# Patient Record
Sex: FEMALE | Race: WHITE
[De-identification: names, ages, dates, MRNs, and addresses within clinical notes are randomized per-mention and may not be internally consistent; named-entity substitution may affect disease eponyms.]

---

## 2018-02-03 ENCOUNTER — HOSPITAL ENCOUNTER (EMERGENCY)
Dept: HOSPITAL 46 - ED | Age: 63
Discharge: HOME | End: 2018-02-03
Payer: COMMERCIAL

## 2018-02-03 VITALS — WEIGHT: 184.99 LBS | BODY MASS INDEX: 32.78 KG/M2 | HEIGHT: 63 IN

## 2018-02-03 DIAGNOSIS — E03.9: ICD-10-CM

## 2018-02-03 DIAGNOSIS — F32.9: ICD-10-CM

## 2018-02-03 DIAGNOSIS — I10: ICD-10-CM

## 2018-02-03 DIAGNOSIS — Z90.49: ICD-10-CM

## 2018-02-03 DIAGNOSIS — F41.9: ICD-10-CM

## 2018-02-03 DIAGNOSIS — Z79.899: ICD-10-CM

## 2018-02-03 DIAGNOSIS — Z88.8: ICD-10-CM

## 2018-02-03 DIAGNOSIS — Z90.89: ICD-10-CM

## 2018-02-03 DIAGNOSIS — R07.89: Primary | ICD-10-CM

## 2018-02-03 NOTE — XMS
Clinical Summary
  Created on: 2018
 
 Yolette Faust
 External Reference #: [id number]
 : [birth year]
 Sex: Female
 
 Demographics
 
 
+-----------------------+------------------------+
| Address               | [address]        |
|                       | [place]  [address]   |
+-----------------------+------------------------+
| Home Phone            | [phone]        |
+-----------------------+------------------------+
| Preferred Language    | Unknown                |
+-----------------------+------------------------+
| Marital Status        |                 |
+-----------------------+------------------------+
| Church Affiliation | Unknown                |
+-----------------------+------------------------+
| Race                  | White                  |
+-----------------------+------------------------+
| Ethnic Group          | Not  or  |
+-----------------------+------------------------+
 
 
 Author
 
 
+--------------+-----------------------+
| Author       | [facility] |
+--------------+-----------------------+
| Organization | [facility] |
+--------------+-----------------------+
| Address      | Unknown               |
+--------------+-----------------------+
| Phone        | Unavailable           |
+--------------+-----------------------+
 
 
 
 Support
 
 
+------+--------------+---------+-----------------+
| Name | Relationship | Address | Phone           |
+------+--------------+---------+-----------------+
 ECON         | Unknown | [phone] |
+------+--------------+---------+-----------------+
 
 
 
 Care Team Providers
 
 
 
+-----------------------+------+-------------+
| Care Team Member Name | Role | Phone       |
+-----------------------+------+-------------+
 PP   | Unavailable |
+-----------------------+------+-------------+
 
 
 
 Source Comments
 NATALIE is fully live on both [facility] and [facility]
 
 Allergies
 No Known Allergies
 
 Current Medications
 
 
+----------------------+----------------------+-------+---------+------+------+-------+
| Prescription         | Sig.                 | Disp. | Refills | Star | End  | Statu |
|                      |                      |       |         | t    | Date | s     |
|                      |                      |       |         | Date |      |       |
+----------------------+----------------------+-------+---------+------+------+-------+
|                      | Take 1 Tab by mouth  |       |         |      |      | Activ |
| candesartan-hydrochl | once daily.          |       |         |      |      | e     |
| orothiazide (ATACAND |                      |       |         |      |      |       |
|  HCT) 32-12.5 mg     |                      |       |         |      |      |       |
| Oral Tablet          |                      |       |         |      |      |       |
+----------------------+----------------------+-------+---------+------+------+-------+
|   atorvastatin       | Take 20 mg by mouth  |       |         |      |      | Activ |
| (LIPITOR) 20 mg Oral | once daily.          |       |         |      |      | e     |
|  Tablet              |                      |       |         |      |      |       |
+----------------------+----------------------+-------+---------+------+------+-------+
|   Levothyroxine 88   | Take 1 Cap by mouth  |       |         |      |      | Activ |
| mcg Oral Capsule     | once daily.          |       |         |      |      | e     |
+----------------------+----------------------+-------+---------+------+------+-------+
|   sertraline         | Take 200 mg by mouth |       |         |      |      | Activ |
| (ZOLOFT) 100 mg Oral |  once daily.         |       |         |      |      | e     |
|  Tablet              |                      |       |         |      |      |       |
+----------------------+----------------------+-------+---------+------+------+-------+
|   ARIPiprazole       | Take 2 mg by mouth   |       |         |      |      | Activ |
| (ABILIFY) 2 mg Oral  | once daily.          |       |         |      |      | e     |
| Tablet               |                      |       |         |      |      |       |
+----------------------+----------------------+-------+---------+------+------+-------+
 
 
 
 Active Problems
 
 
+-------------------------+------------+
| Problem                 | Noted Date |
+-------------------------+------------+
| Spinal meningioma (HCC) | 2013 |
+-------------------------+------------+
 
 
 
 Family History
 
 
 
+-----------------+----------+------+----------+
| Medical History | Relation | Name | Comments |
+-----------------+----------+------+----------+
| Arthritis       | Mother   |      |          |
+-----------------+----------+------+----------+
| Hypertension    | Mother   |      |          |
+-----------------+----------+------+----------+
 
 
 
+----------+------+--------+----------+
| Relation | Name | Status | Comments |
+----------+------+--------+----------+
| Mother   |      |        |          |
+----------+------+--------+----------+
 
 
 
 Social History
 
 
+--------------+-------+-----------+--------+------+
| Tobacco Use  | Types | Packs/Day | Years  | Date |
|              |       |           | Used   |      |
+--------------+-------+-----------+--------+------+
| Never Smoker |       |           |        |      |
+--------------+-------+-----------+--------+------+
 
 
 
+-------------+-----------+---------+----------+
| Alcohol Use | Drinks/We | oz/Week | Comments |
|             | ek        |         |          |
+-------------+-----------+---------+----------+
| No          |           |         |          |
+-------------+-----------+---------+----------+
 
 
 
+------------------+---------------+
| Sex Assigned at  | Date Recorded |
| Birth            |               |
+------------------+---------------+
| Not on file      |               |
+------------------+---------------+
 
 
 
 Last Filed Vital Signs
 
 
+-------------------+----------------------+-------------------------+
| Vital Sign        | Reading              | Time Taken              |
+-------------------+----------------------+-------------------------+
| Blood Pressure    | 100/58               | 2013 12:43 PM PDT |
+-------------------+----------------------+-------------------------+
| Pulse             | 112                  | 2013 12:43 PM PDT |
+-------------------+----------------------+-------------------------+
| Temperature       | 36.8   C (98.3   F)  | 2013 12:43 PM PDT |
 
+-------------------+----------------------+-------------------------+
| Respiratory Rate  | -                    | -                       |
+-------------------+----------------------+-------------------------+
| Oxygen Saturation | -                    | -                       |
+-------------------+----------------------+-------------------------+
| Inhaled Oxygen    | -                    | -                       |
| Concentration     |                      |                         |
+-------------------+----------------------+-------------------------+
| Weight            | 96.1 kg (211 lb 12.8 | 2013 12:43 PM PDT |
|                   |  oz)                 |                         |
+-------------------+----------------------+-------------------------+
| Height            | 161.3 cm (5' 3.5")   | 2011  1:01 PM PST |
+-------------------+----------------------+-------------------------+
| Body Mass Index   | 36.93                | 2013 12:43 PM PDT |
+-------------------+----------------------+-------------------------+
 
 
 
 Plan of Treatment
 
 
+--------------------+-----------+-----------+----------+
| Health Maintenance | Due Date  | Last Done | Comments |
+--------------------+-----------+-----------+----------+
| INFLUENZA VACCINE  |  |           |          |
| (FLU SHOT)         | 7         |           |          |
+--------------------+-----------+-----------+----------+
 
 
 
 Results
 Not on filefrom Last 3 Months

## 2018-02-03 NOTE — XMS
Clinical Summary
  Created on: 2018
 
 Yolette Faust
 External Reference #: [id number]
 : [birth year]
 Sex: Female
 
 Demographics
 
 
+-----------------------+------------------------+
| Address               | [address]        |
|                       | [place]  [address]   |
+-----------------------+------------------------+
| Home Phone            | [phone]        |
+-----------------------+------------------------+
| Preferred Language    | Unknown                |
+-----------------------+------------------------+
| Marital Status        |                 |
+-----------------------+------------------------+
| [employer] Affiliation | Unknown                |
+-----------------------+------------------------+
| Race                  | White                  |
+-----------------------+------------------------+
| Ethnic Group          | Not  or  |
+-----------------------+------------------------+
 
 
 Author
 
 
+--------------+-----------------------+
| Author       | [facility] |
+--------------+-----------------------+
| Organization | [facility] |
+--------------+-----------------------+
| Address      | Unknown               |
+--------------+-----------------------+
| Phone        | Unavailable           |
+--------------+-----------------------+
 
 
 
 Support
 
 
+------+--------------+---------+-----------------+
| Name | Relationship | Address | Phone           |
+------+--------------+---------+-----------------+
 ECON         | Unknown | [phone] |
+------+--------------+---------+-----------------+
 
 
 
 Care Team Providers
 
 
 
+-----------------------+------+-------------+
| Care Team Member Name | Role | Phone       |
+-----------------------+------+-------------+
 PP   | Unavailable |
+-----------------------+------+-------------+
 
 
 
 Source Comments
 NATALIE is fully live on both [facility] and [facility]
 
 Allergies
 No Known Allergies
 
 Current Medications
 
 
+----------------------+----------------------+-------+---------+------+------+-------+
| Prescription         | Sig.                 | Disp. | Refills | Star | End  | Statu |
|                      |                      |       |         | t    | Date | s     |
|                      |                      |       |         | Date |      |       |
+----------------------+----------------------+-------+---------+------+------+-------+
|                      | Take 1 Tab by mouth  |       |         |      |      | Activ |
| candesartan-hydrochl | once daily.          |       |         |      |      | e     |
| orothiazide (ATACAND |                      |       |         |      |      |       |
|  HCT) 32-12.5 mg     |                      |       |         |      |      |       |
| Oral Tablet          |                      |       |         |      |      |       |
+----------------------+----------------------+-------+---------+------+------+-------+
|   atorvastatin       | Take 20 mg by mouth  |       |         |      |      | Activ |
| (LIPITOR) 20 mg Oral | once daily.          |       |         |      |      | e     |
|  Tablet              |                      |       |         |      |      |       |
+----------------------+----------------------+-------+---------+------+------+-------+
|   Levothyroxine 88   | Take 1 Cap by mouth  |       |         |      |      | Activ |
| mcg Oral Capsule     | once daily.          |       |         |      |      | e     |
+----------------------+----------------------+-------+---------+------+------+-------+
|   sertraline         | Take 200 mg by mouth |       |         |      |      | Activ |
| (ZOLOFT) 100 mg Oral |  once daily.         |       |         |      |      | e     |
|  Tablet              |                      |       |         |      |      |       |
+----------------------+----------------------+-------+---------+------+------+-------+
|   ARIPiprazole       | Take 2 mg by mouth   |       |         |      |      | Activ |
| (ABILIFY) 2 mg Oral  | once daily.          |       |         |      |      | e     |
| Tablet               |                      |       |         |      |      |       |
+----------------------+----------------------+-------+---------+------+------+-------+
 
 
 
 Active Problems
 
 
+-------------------------+------------+
| Problem                 | Noted Date |
+-------------------------+------------+
| Spinal meningioma (HCC) | 2013 |
+-------------------------+------------+
 
 
 
 Family History
 
 
 
+-----------------+----------+------+----------+
| Medical History | Relation | Name | Comments |
+-----------------+----------+------+----------+
| Arthritis       | Mother   |      |          |
+-----------------+----------+------+----------+
| Hypertension    | Mother   |      |          |
+-----------------+----------+------+----------+
 
 
 
+----------+------+--------+----------+
| Relation | Name | Status | Comments |
+----------+------+--------+----------+
| Mother   |      |        |          |
+----------+------+--------+----------+
 
 
 
 Social History
 
 
+--------------+-------+-----------+--------+------+
| Tobacco Use  | Types | Packs/Day | Years  | Date |
|              |       |           | Used   |      |
+--------------+-------+-----------+--------+------+
| Never Smoker |       |           |        |      |
+--------------+-------+-----------+--------+------+
 
 
 
+-------------+-----------+---------+----------+
| Alcohol Use | Drinks/We | oz/Week | Comments |
|             | ek        |         |          |
+-------------+-----------+---------+----------+
| No          |           |         |          |
+-------------+-----------+---------+----------+
 
 
 
+------------------+---------------+
| Sex Assigned at  | Date Recorded |
| Birth            |               |
+------------------+---------------+
| Not on file      |               |
+------------------+---------------+
 
 
 
 Last Filed Vital Signs
 
 
+-------------------+----------------------+-------------------------+
| Vital Sign        | Reading              | Time Taken              |
+-------------------+----------------------+-------------------------+
| Blood Pressure    | 100/58               | 2013 12:43 PM PDT |
+-------------------+----------------------+-------------------------+
| Pulse             | 112                  | 2013 12:43 PM PDT |
+-------------------+----------------------+-------------------------+
| Temperature       | 36.8   C (98.3   F)  | 2013 12:43 PM PDT |
 
+-------------------+----------------------+-------------------------+
| Respiratory Rate  | -                    | -                       |
+-------------------+----------------------+-------------------------+
| Oxygen Saturation | -                    | -                       |
+-------------------+----------------------+-------------------------+
| Inhaled Oxygen    | -                    | -                       |
| Concentration     |                      |                         |
+-------------------+----------------------+-------------------------+
| Weight            | 96.1 kg (211 lb 12.8 | 2013 12:43 PM PDT |
|                   |  oz)                 |                         |
+-------------------+----------------------+-------------------------+
| Height            | 161.3 cm (5' 3.5")   | 2011  1:01 PM PST |
+-------------------+----------------------+-------------------------+
| Body Mass Index   | 36.93                | 2013 12:43 PM PDT |
+-------------------+----------------------+-------------------------+
 
 
 
 Plan of Treatment
 
 
+--------------------+-----------+-----------+----------+
| Health Maintenance | Due Date  | Last Done | Comments |
+--------------------+-----------+-----------+----------+
| INFLUENZA VACCINE  |  |           |          |
| (FLU SHOT)         | 7         |           |          |
+--------------------+-----------+-----------+----------+
 
 
 
 Results
 Not on filefrom Last 3 Months

## 2018-02-05 NOTE — EKG
[facility]
                                    [address]
                                  [place], [place]  [address]
_________________________________________________________________________________________
                                                                 Signed   
 
 
Normal sinus rhythm
Normal ECG
When compared with ECG of 30-MAY-2017 08:43,
No significant change was found
Confirmed by EDUARDO GRIFFIN MD ([id number]) on 2/5/2018 2:47:29 PM
 
 
 
 
 
 
 
 
 
 
 
 
 
 
 
 
 
 
 
 
 
 
 
 
 
 
 
 
 
 
 
 
 
 
 
 
 
 
 
 
    Electronically Signed By: EDUARDO GRIFFIN MD  02/05/18 1447
_________________________________________________________________________________________
PATIENT NAME:     SANIYA SOTO                     
MEDICAL RECORD #: [id number]                     Electrocardiogram             
          ACCT #: [id number]  
DATE OF BIRTH:   [birth year]                                       
PHYSICIAN:   EDUARDO GRIFFIN MD           REPORT #: [id number]
REPORT IS CONFIDENTIAL AND NOT TO BE RELEASED WITHOUT AUTHORIZATION

## 2018-10-01 ENCOUNTER — HOSPITAL ENCOUNTER (EMERGENCY)
Dept: HOSPITAL 46 - ED | Age: 63
Discharge: HOME | End: 2018-10-01
Payer: MEDICARE

## 2018-10-01 VITALS — HEIGHT: 63 IN | WEIGHT: 180.01 LBS | BODY MASS INDEX: 31.89 KG/M2

## 2018-10-01 DIAGNOSIS — M79.601: Primary | ICD-10-CM

## 2018-10-01 DIAGNOSIS — I10: ICD-10-CM

## 2018-10-01 DIAGNOSIS — Z79.82: ICD-10-CM

## 2018-10-01 DIAGNOSIS — Z88.8: ICD-10-CM

## 2018-10-01 DIAGNOSIS — Z79.899: ICD-10-CM

## 2018-10-01 DIAGNOSIS — E11.9: ICD-10-CM

## 2018-10-01 DIAGNOSIS — F32.9: ICD-10-CM

## 2018-10-01 DIAGNOSIS — F41.9: ICD-10-CM

## 2018-10-01 DIAGNOSIS — E03.9: ICD-10-CM

## 2018-10-06 ENCOUNTER — HOSPITAL ENCOUNTER (EMERGENCY)
Dept: HOSPITAL 46 - ED | Age: 63
End: 2018-10-06
Payer: MEDICARE

## 2018-10-06 VITALS — HEIGHT: 63 IN | BODY MASS INDEX: 31.89 KG/M2 | WEIGHT: 180.01 LBS

## 2018-10-06 DIAGNOSIS — Z88.8: ICD-10-CM

## 2018-10-06 DIAGNOSIS — Z79.899: ICD-10-CM

## 2018-10-06 DIAGNOSIS — I10: ICD-10-CM

## 2018-10-06 DIAGNOSIS — M25.511: ICD-10-CM

## 2018-10-06 DIAGNOSIS — Z79.82: ICD-10-CM

## 2018-10-06 DIAGNOSIS — E11.9: ICD-10-CM

## 2018-10-06 DIAGNOSIS — E03.9: ICD-10-CM

## 2018-10-06 DIAGNOSIS — G89.18: Primary | ICD-10-CM

## 2018-10-06 DIAGNOSIS — F41.9: ICD-10-CM

## 2018-10-06 DIAGNOSIS — F32.9: ICD-10-CM

## 2018-10-06 NOTE — XMS
Clinical Summary
  Created on: 10/08/2018
 
 Yolette Soto
 External Reference #: [id number]
 : [birth year]
 Sex: Female
 
 Demographics
 
 
+-----------------------+------------------------+
| Address               | [address]        |
|                       | [place]  [address]   |
+-----------------------+------------------------+
| Home Phone            | [phone]        |
+-----------------------+------------------------+
| Preferred Language    | Unknown                |
+-----------------------+------------------------+
| Marital Status        |                 |
+-----------------------+------------------------+
| Taoist Affiliation | Unknown                |
+-----------------------+------------------------+
| Race                  | White                  |
+-----------------------+------------------------+
| Ethnic Group          | Not  or  |
+-----------------------+------------------------+
 
 
 Author
 
 
+--------------+-----------------------+
| Author       | [facility] |
+--------------+-----------------------+
| Organization | [facility] |
+--------------+-----------------------+
| Address      | Unknown               |
+--------------+-----------------------+
| Phone        | Unavailable           |
+--------------+-----------------------+
 
 
 
 Support
 
 
+-----------+--------------+---------+-----------------+
| Name      | Relationship | Address | Phone           |
+-----------+--------------+---------+-----------------+
| Nadia Vu | ECON         | Unknown | [phone] |
+-----------+--------------+---------+-----------------+
 
 
 
 Care Team Providers
 
 
 
+-----------------------+------+-------------+
| Care Team Member Name | Role | Phone       |
+-----------------------+------+-------------+
 PP   | Unavailable |
+-----------------------+------+-------------+
 
 
 
 Source Comments
 [facility] is fully live on both [facility] and [facility]
 
 Allergies
 No Known Allergies
 
 Current Medications
 
 
+----------------------+----------------------+-------+---------+------+------+-------+
| Prescription         | Sig.                 | Disp. | Refills | Star | End  | Statu |
|                      |                      |       |         | t    | Date | s     |
|                      |                      |       |         | Date |      |       |
+----------------------+----------------------+-------+---------+------+------+-------+
|                      | Take 1 Tab by mouth  |       |         |      |      | Activ |
| candesartan-hydrochl | once daily.          |       |         |      |      | e     |
| orothiazide (ATACAND |                      |       |         |      |      |       |
|  HCT) 32-12.5 mg     |                      |       |         |      |      |       |
| Oral Tablet          |                      |       |         |      |      |       |
+----------------------+----------------------+-------+---------+------+------+-------+
|   atorvastatin       | Take 20 mg by mouth  |       |         |      |      | Activ |
| (LIPITOR) 20 mg Oral | once daily.          |       |         |      |      | e     |
|  Tablet              |                      |       |         |      |      |       |
+----------------------+----------------------+-------+---------+------+------+-------+
|   Levothyroxine 88   | Take 1 Cap by mouth  |       |         |      |      | Activ |
| mcg Oral Capsule     | once daily.          |       |         |      |      | e     |
+----------------------+----------------------+-------+---------+------+------+-------+
|   sertraline         | Take 200 mg by mouth |       |         |      |      | Activ |
| (ZOLOFT) 100 mg Oral |  once daily.         |       |         |      |      | e     |
|  Tablet              |                      |       |         |      |      |       |
+----------------------+----------------------+-------+---------+------+------+-------+
|   ARIPiprazole       | Take 2 mg by mouth   |       |         |      |      | Activ |
| (ABILIFY) 2 mg Oral  | once daily.          |       |         |      |      | e     |
| Tablet               |                      |       |         |      |      |       |
+----------------------+----------------------+-------+---------+------+------+-------+
 
 
 
 Active Problems
 
 
+-------------------------+------------+
| Problem                 | Noted Date |
+-------------------------+------------+
| Spinal meningioma (HCC) | 2013 |
+-------------------------+------------+
 
 
 
 Family History
 
 
 
+-----------------+----------+------+----------+
| Medical History | Relation | Name | Comments |
+-----------------+----------+------+----------+
| Arthritis       | Mother   |      |          |
+-----------------+----------+------+----------+
| Hypertension    | Mother   |      |          |
+-----------------+----------+------+----------+
 
 
 
+----------+------+--------+----------+
| Relation | Name | Status | Comments |
+----------+------+--------+----------+
| Mother   |      |        |          |
+----------+------+--------+----------+
 
 
 
 Social History
 
 
+--------------+-------+-----------+--------+------+
| Tobacco Use  | Types | Packs/Day | Years  | Date |
|              |       |           | Used   |      |
+--------------+-------+-----------+--------+------+
| Never Smoker |       |           |        |      |
+--------------+-------+-----------+--------+------+
 
 
 
+-------------+-----------+---------+----------+
| Alcohol Use | Drinks/We | oz/Week | Comments |
|             | ek        |         |          |
+-------------+-----------+---------+----------+
| No          |           |         |          |
+-------------+-----------+---------+----------+
 
 
 
+------------------+---------------+
| Sex Assigned at  | Date Recorded |
| Birth            |               |
+------------------+---------------+
| Not on file      |               |
+------------------+---------------+
 
 
 
 Last Filed Vital Signs
 
 
+-------------------+----------------------+-------------------------+
| Vital Sign        | Reading              | Time Taken              |
+-------------------+----------------------+-------------------------+
| Blood Pressure    | 100/58               | 2013 12:43 PM PDT |
+-------------------+----------------------+-------------------------+
| Pulse             | 112                  | 2013 12:43 PM PDT |
+-------------------+----------------------+-------------------------+
| Temperature       | 36.8   C (98.3   F)  | 2013 12:43 PM PDT |
 
+-------------------+----------------------+-------------------------+
| Respiratory Rate  | -                    | -                       |
+-------------------+----------------------+-------------------------+
| Oxygen Saturation | -                    | -                       |
+-------------------+----------------------+-------------------------+
| Inhaled Oxygen    | -                    | -                       |
| Concentration     |                      |                         |
+-------------------+----------------------+-------------------------+
| Weight            | 96.1 kg (211 lb 12.8 | 2013 12:43 PM PDT |
|                   |  oz)                 |                         |
+-------------------+----------------------+-------------------------+
| Height            | 161.3 cm (5' 3.5")   | 2011  1:01 PM PST |
+-------------------+----------------------+-------------------------+
| Body Mass Index   | 36.93                | 2013 12:43 PM PDT |
+-------------------+----------------------+-------------------------+
 
 
 
 Plan of Treatment
 
 
+----------------------+-----------+-----------+----------+
| Health Maintenance   | Due Date  | Last Done | Comments |
+----------------------+-----------+-----------+----------+
| Pneumococcal (Adult) | 08/15/201 |           |          |
|  (1 of 2 - PCV13)    | 8         |           |          |
+----------------------+-----------+-----------+----------+
| Influenza (Flu)      |  |           |          |
| vaccination (#1)     | 8         |           |          |
+----------------------+-----------+-----------+----------+
 
 
 
 Results
 Not on filefrom Last 3 Months
 
 Insurance
 
 
+---------------+--------+-------------+------+-------------+-----------------+
| Payer         | Benefi | Subscriber  | Type | Phone       | Address         |
|               | t Plan | ID          |      |             |                 |
|               |  /     |             |      |             |                 |
|               | Group  |             |      |             |                 |
+---------------+--------+-------------+------+-------------+-----------------+
| [employer] | HEALTH | xxxxxxxxx   | PPO  | [phone] |   [address]   |
|               | NET    |             |      | [id number]        | [place]  |
|               | PPO    |             |      |             | [id number]      |
+---------------+--------+-------------+------+-------------+-----------------+
 
 
 
+-------------------+--------+-------------+--------+-------------+---------------------+
| Guarantor Name    | [employer] | Relation to | Date   | Phone       | Billing Address     |
|                   | t Type |  Patient    | of     |             |                     |
|                   |        |             | Birth  |             |                     |
+-------------------+--------+-------------+--------+-------------+---------------------+
| YOLETTE SOTO | Person | Self        | [birth year] |   Home:     |   [address] [place] [address]   |
|                   | al/Fam |             | 1955   | [phone] | [place] [address] |
|                   | dusty    |             |        | [phone]        |                     |
 
+-------------------+--------+-------------+--------+-------------+---------------------+

## 2018-10-06 NOTE — XMS
PreManage Notification: SANIYA SOTO MRN:[id number]
 
Security Information
 
Security Events
No recent Security Events currently on file
 
 
 
CRITERIA MET
------------
- [facility] - 2 Visits in 30 Days
 
 
CARE PROVIDERS
-------------------------------------------------------------------------------------
KAR LOUIS     [facility]     Current
 
PHONE: Unknown
-------------------------------------------------------------------------------------
KAR LOUIS     [facility]     Current
 
PHONE: Unknown
-------------------------------------------------------------------------------------
HEATHER JENKINS      [facility]
 
PHONE: Unknown
-------------------------------------------------------------------------------------
Rachel Page PA-C
 
PHONE: Unknown
-------------------------------------------------------------------------------------
 
[facility] has no Care Guidelines for this patient.
 
LUIZ VISIT COUNT (12 MO.)
-------------------------------------------------------------------------------------
3 [facility] Fox
-------------------------------------------------------------------------------------
TOTAL 3
-------------------------------------------------------------------------------------
NOTE: Visits indicate total known visits.
 
ED/UCC VISIT TRACKING (12 MO.)
-------------------------------------------------------------------------------------
10/06/2018 18:58
[facility] [place]
 
TYPE: Emergency
 
COMPLAINT:
- R ARM PAIN/POST SURGERY X 2 WEEKS
-------------------------------------------------------------------------------------
10/01/2018 00:24
[facility] [place]
 
TYPE: Emergency
 
 
COMPLAINT:
- R ARM PAIN
 
DIAGNOSES:
- Anxiety disorder, unspecified
- Allergy status to other drugs, medicaments and biological substances status
- Long term (current) use of aspirin
- Pain in right arm
- Essential (primary) hypertension
- Hypothyroidism, unspecified
- Other long term (current) drug therapy
- Major depressive disorder, single episode, unspecified
- Type 2 diabetes mellitus without complications
-------------------------------------------------------------------------------------
02/03/2018 07:37
[facility] [place]
 
TYPE: Emergency
 
COMPLAINT:
- CHEST PAIN
 
DIAGNOSES:
- Anxiety disorder, unspecified
- Allergy status to other drugs, medicaments and biological substances status
- Essential (primary) hypertension
- Other chest pain
- Other long term (current) drug therapy
- Acquired absence of other specified parts of digestive tract
- Hypothyroidism, unspecified
- Major depressive disorder, single episode, unspecified
- Acquired absence of other organs
-------------------------------------------------------------------------------------
 
 
INPATIENT VISIT TRACKING (12 MO.)
-------------------------------------------------------------------------------------
09/24/2018 09:43
[facility]     [facility]
 
TYPE: Orthopedic
 
DIAGNOSES:
- Presence of right artificial shoulder joint
-------------------------------------------------------------------------------------
 
[email or web address]

## 2018-10-06 NOTE — XMS
Clinical Summary
  Created on: 10/08/2018
 
 Yolette Soto
 External Reference #: [id number]
 : [birth year]
 Sex: Female
 
 Demographics
 
 
+-----------------------+----------------------+
| Address               | [address]      |
|                       | [place]  [address] |
+-----------------------+----------------------+
| Home Phone            | [phone]      |
+-----------------------+----------------------+
| Preferred Language    | Unknown              |
+-----------------------+----------------------+
| Marital Status        |               |
+-----------------------+----------------------+
| Worship Affiliation | [id number]                 |
+-----------------------+----------------------+
| Race                  | Unknown              |
+-----------------------+----------------------+
| Ethnic Group          | Unknown              |
+-----------------------+----------------------+
 
 
 Author
 
 
+--------------+--------------------------------------------+
| Author       | [facility]  |
|              | and [place]                                |
+--------------+--------------------------------------------+
| Organization | [facility]  |
|              | and [place]                                |
+--------------+--------------------------------------------+
| Address      | Unknown                                    |
+--------------+--------------------------------------------+
| Phone        | Unavailable                                |
+--------------+--------------------------------------------+
 
 
 
 Support
 
 
+--------------+--------------+---------------------+-----------------+
| Name         | Relationship | Address             | Phone           |
+--------------+--------------+---------------------+-----------------+
| Nadia Vu    | ECON         | [address]               | [phone] |
|              |              | [place],  |                 |
|              |              | [place]  [address]           |                 |
+--------------+--------------+---------------------+-----------------+
| Blayne Soto | ECON         | [address]       | [phone] |
|              |              | [place]     |                 |
 
|              |              | [id number]               |                 |
+--------------+--------------+---------------------+-----------------+
 
 
 
 Care Team Providers
 
 
+---------------------------+------+-----------------+
| Care Team Member Name     | Role | Phone           |
+---------------------------+------+-----------------+
| Morales Carrington MD | PP   | [phone] |
+---------------------------+------+-----------------+
 
 
 
 Allergies
 
 
+-----------------+----------------------+----------+----------+----------------------+
| Active Allergy  | Reactions            | Severity | Noted    | Comments             |
|                 |                      |          | Date     |                      |
+-----------------+----------------------+----------+----------+----------------------+
| Bupropion Hcl   | Other (See Comments) | Medium   |          |   Mood Change        |
+-----------------+----------------------+----------+----------+----------------------+
| Fentanyl        | Other (See Comments) | High     | 20 |   Heart Palptation,  |
|                 |                      |          | 13       | fatigue, itching,    |
|                 |                      |          |          | and constipation     |
+-----------------+----------------------+----------+----------+----------------------+
| Tricyclic       | Diarrhea, Nausea And | Medium   | 20 |                      |
| Antidepressants |  Vomiting            |          | 13       |                      |
+-----------------+----------------------+----------+----------+----------------------+
 
 
 
 Current Medications
 
 
+----------------------+----------------------+-------+---------+------+------+-------+
| Prescription         | Sig.                 | Disp. | Refills | Star | End  | Statu |
|                      |                      |       |         | t    | Date | s     |
|                      |                      |       |         | Date |      |       |
+----------------------+----------------------+-------+---------+------+------+-------+
|   ARIPiprazole       | Take 2 mg by mouth   |       |         |  |      | Activ |
| (ABILIFY) 2 mg       | Daily.               |       |         |  |      | e     |
| tablet               |                      |       |         | 12   |      |       |
+----------------------+----------------------+-------+---------+------+------+-------+
|   Calcium            | Take  by mouth       |       |         |  |      | Activ |
| Carbonate-Vit D-Min  | Daily.               |       |         | 20 |      | e     |
| (CALTRATE 600+D      |                      |       |         | 12   |      |       |
| PLUS) 600-400        |                      |       |         |      |      |       |
| MG-UNIT TABS         |                      |       |         |      |      |       |
+----------------------+----------------------+-------+---------+------+------+-------+
|                      | daily                |       |         | 091 |      | Activ |
| multivitamin-iron-mi |                      |       |         | 5/20 |      | e     |
| nerals-folic acid    |                      |       |         | 12   |      |       |
| (CENTRUM) chewable   |                      |       |         |      |      |       |
| tablet               |                      |       |         |      |      |       |
+----------------------+----------------------+-------+---------+------+------+-------+
|   naproxen sodium    | Take 220 mg by mouth |       |         |  |      | Activ |
 
| (ALEVE) 220 MG       |  as needed.          |       |         |  |      | e     |
| tablet               |                      |       |         | 12   |      |       |
+----------------------+----------------------+-------+---------+------+------+-------+
|   atorvaSTATin       | Take 20 mg by mouth  |       |         |  |      | Activ |
| (LIPITOR) 20 mg      | Daily.               |       |         |  |      | e     |
| tablet               |                      |       |         | 12   |      |       |
+----------------------+----------------------+-------+---------+------+------+-------+
|   sertraline         | 2 tablets daily      |       |         |  |      | Activ |
| (ZOLOFT) 100 mg      |                      |       |         |  |      | e     |
| tablet               |                      |       |         | 12   |      |       |
+----------------------+----------------------+-------+---------+------+------+-------+
|   levothyroxine      | Take 88 mcg by mouth |       |         |  |      | Activ |
| (SYNTHROID) 88 mcg   |  Daily.              |       |         |  |      | e     |
| tablet               |                      |       |         | 12   |      |       |
+----------------------+----------------------+-------+---------+------+------+-------+
|   acetaminophen      | Take 325 mg by mouth |       |         |  |      | Activ |
| (TYLENOL) 325 mg     |  as needed.          |       |         |  |      | e     |
| tablet               |                      |       |         | 12   |      |       |
+----------------------+----------------------+-------+---------+------+------+-------+
|   clonazePAM         | Take 1 mg by mouth 3 |       |         |  |      | Activ |
| (KLONOPIN) 1 mg      |  times daily as      |       |         |  |      | e     |
| tablet               | needed.              |       |         | 12   |      |       |
+----------------------+----------------------+-------+---------+------+------+-------+
|                      | 2 tablets once daily |       |         |  |      | Activ |
| candesartan-hydrochl |                      |       |         |  |      | e     |
| orothiazide (ATACAND |                      |       |         | 12   |      |       |
|  HCT) 32-12.5 MG per |                      |       |         |      |      |       |
|  tablet              |                      |       |         |      |      |       |
+----------------------+----------------------+-------+---------+------+------+-------+
 
 
 
 Active Problems
 
 
+----------------------------------------------+------------+
| Problem                                      | Noted Date |
+----------------------------------------------+------------+
| CHRONIC MIGRAINE W/O AURA W/INTRACTABLE W/SM | 2012 |
+----------------------------------------------+------------+
| OCCIPITAL NEURALGIA                          | 2012 |
+----------------------------------------------+------------+
| DEGENERATIVE DISC DISEASE                    |            |
+----------------------------------------------+------------+
| HERNIATED DISC                               |            |
+----------------------------------------------+------------+
| HEADACHE                                     |            |
+----------------------------------------------+------------+
 
 
 
+------------------------------------+
|   Overview:   ICD-10 Record update |
+------------------------------------+
 
 
 
+-------------------------------+---+
| MENINGIOMA, SPINAL C2         |   |
+-------------------------------+---+
 
| SPINAL STENOSIS, CERVICAL     |   |
+-------------------------------+---+
| NECK PAIN                     |   |
+-------------------------------+---+
| SPASMODIC TORTICOLLIS         |   |
+-------------------------------+---+
| CHRONIC TENSION TYPE HEADACHE |   |
+-------------------------------+---+
| Anxiety                       |   |
+-------------------------------+---+
| HTN (hypertension)            |   |
+-------------------------------+---+
| Hypercholesteremia            |   |
+-------------------------------+---+
| Hypothyroidism                |   |
+-------------------------------+---+
| Depression                    |   |
+-------------------------------+---+
| Pneumonia                     |   |
+-------------------------------+---+
| Kidney stones                 |   |
+-------------------------------+---+
 
 
 
 Family History
 
 
+-----------------+-----------+------+----------+
| Medical History | Relation  | Name | Comments |
+-----------------+-----------+------+----------+
| Cancer          | Maternal  |      |          |
|                 | Uncle     |      |          |
+-----------------+-----------+------+----------+
| Cancer          | Maternal  |      |          |
|                 | Uncle     |      |          |
+-----------------+-----------+------+----------+
| Arthritis       | Mother    |      |          |
+-----------------+-----------+------+----------+
 
 
 
+----------------+------+--------+----------+
| Relation       | Name | Status | Comments |
+----------------+------+--------+----------+
| Maternal Uncle |      |        |          |
+----------------+------+--------+----------+
| Maternal Uncle |      |        |          |
+----------------+------+--------+----------+
| Mother         |      |        |          |
+----------------+------+--------+----------+
 
 
 
 Social History
 
 
+---------------+-------+-----------+--------+------+
| Tobacco Use   | Types | Packs/Day | Years  | Date |
|               |       |           | Used   |      |
 
+---------------+-------+-----------+--------+------+
| Former Smoker |       |           |        |      |
+---------------+-------+-----------+--------+------+
 
 
 
+---------------------+---+---+---+
| Smokeless Tobacco:  |   |   |   |
| Never Used          |   |   |   |
+---------------------+---+---+---+
 
 
 
+-------------+-----------+---------+----------+
| Alcohol Use | Drinks/We | oz/Week | Comments |
|             | ek        |         |          |
+-------------+-----------+---------+----------+
| Yes         |           |         | RARELY   |
+-------------+-----------+---------+----------+
 
 
 
+------------------+---------------+
| Sex Assigned at  | Date Recorded |
| Birth            |               |
+------------------+---------------+
| Not on file      |               |
+------------------+---------------+
 
 
 
 Last Filed Vital Signs
 
 
+-------------------+------------------+---------------------+
| Vital Sign        | Reading          | Time Taken          |
+-------------------+------------------+---------------------+
| Blood Pressure    | 105/66           | 2013 PDT |
+-------------------+------------------+---------------------+
| Pulse             | 88               | 2013 PDT |
+-------------------+------------------+---------------------+
| Temperature       | -                | -                   |
+-------------------+------------------+---------------------+
| Respiratory Rate  | 18               | 2013 PDT |
+-------------------+------------------+---------------------+
| Oxygen Saturation | -                | -                   |
+-------------------+------------------+---------------------+
| Inhaled Oxygen    | -                | -                   |
| Concentration     |                  |                     |
+-------------------+------------------+---------------------+
| Weight            | 95.3 kg (210 lb) | 2013 PDT |
+-------------------+------------------+---------------------+
| Height            | 160 cm (5' 3")   | 2013 PDT |
+-------------------+------------------+---------------------+
| Body Mass Index   | 37.2             | 2013 PDT |
+-------------------+------------------+---------------------+
 
 
 
 Plan of Treatment
 
 
 
+---------------------+-----------+-----------+----------+
| Health Maintenance  | Due Date  | Last Done | Comments |
+---------------------+-----------+-----------+----------+
| Vaccine:            | 08/15/197 |           |          |
| Dtap/Tdap/Td (1 -   | 2         |           |          |
| Tdap)               |           |           |          |
+---------------------+-----------+-----------+----------+
| Vaccine: Zoster (1  | 08/15/200 |           |          |
| of 2)               | 3         |           |          |
+---------------------+-----------+-----------+----------+
| Vaccine:            | 08/15/201 |           |          |
| Pneumococcal 65+    | 8         |           |          |
| Low/Medium Risk (1  |           |           |          |
| of 2 - PCV13)       |           |           |          |
+---------------------+-----------+-----------+----------+
| Vaccine: Influenza  |  |           |          |
| (#1)                | 8         |           |          |
+---------------------+-----------+-----------+----------+
 
 
 
 Results
 Not on filefrom Last 3 Months
 
 Insurance
 
 
+---------------+--------+-------------+------+-------------+---------+
| Payer         | Benefi | Subscriber  | Type | Phone       | Address |
|               | t Plan | ID          |      |             |         |
|               |  /     |             |      |             |         |
|               | Group  |             |      |             |         |
+---------------+--------+-------------+------+-------------+---------+
| [employer] | PACIFI | [id number] | PPO  | [phone]         |
|               | [facility] |             |      | [id number]        |         |
|               | E      |             |      |             |         |
|               | FIRST  |             |      |             |         |
|               | CHOICE |             |      |             |         |
+---------------+--------+-------------+------+-------------+---------+
 
 
 
+-----------------+--------+-------------+--------+-------------+---------------------+
| Guarantor Name  | [employer] | Relation to | Date   | Phone       | Billing Address     |
|                 | t Type |  Patient    | of     |             |                     |
|                 |        |             | Birth  |             |                     |
+-----------------+--------+-------------+--------+-------------+---------------------+
| YOLETTE SOTO | Person | Self        | [birth year] |   Work:     |   [address]    |
|                 | [address] |             |    | [phone] | [place] [address] |
|                 | dusty    |             |        | [address]  Home: |                     |
|                 |        |             |        |             |                     |
|                 |        |             |        | [phone] |                     |
|                 |        |             |        | [address]        |                     |
+-----------------+--------+-------------+--------+-------------+---------------------+

## 2018-10-06 NOTE — XMS
Clinical Summary
  Created on: 10/08/2018
 
 Yolette Soto
 External Reference #: [id number]
 : [birth year]
 Sex: Female
 
 Demographics
 
 
+-----------------------+----------------------+
| Address               | [address]      |
|                       | [place]  [address] |
+-----------------------+----------------------+
| Home Phone            | [phone]      |
+-----------------------+----------------------+
| Preferred Language    | Unknown              |
+-----------------------+----------------------+
| Marital Status        |               |
+-----------------------+----------------------+
| Sikhism Affiliation | [id number]                 |
+-----------------------+----------------------+
| Race                  | Unknown              |
+-----------------------+----------------------+
| Ethnic Group          | Unknown              |
+-----------------------+----------------------+
 
 
 Author
 
 
+--------------+--------------------------------------------+
| Author       | [facility]  |
|              | and [place]                                |
+--------------+--------------------------------------------+
| Organization | [facility]  |
|              | and [place]                                |
+--------------+--------------------------------------------+
| Address      | Unknown                                    |
+--------------+--------------------------------------------+
| Phone        | Unavailable                                |
+--------------+--------------------------------------------+
 
 
 
 Support
 
 
+--------------+--------------+---------------------+-----------------+
| Name         | Relationship | Address             | Phone           |
+--------------+--------------+---------------------+-----------------+
| Nadia Vu    | ECON         | [address]               | [phone] |
|              |              | [place],  |                 |
|              |              | [place]  [address]           |                 |
+--------------+--------------+---------------------+-----------------+
| Blayne Soto | ECON         | [address]       | [phone] |
|              |              | [place]     |                 |
 
|              |              | [id number]               |                 |
+--------------+--------------+---------------------+-----------------+
 
 
 
 Care Team Providers
 
 
+---------------------------+------+-----------------+
| Care Team Member Name     | Role | Phone           |
+---------------------------+------+-----------------+
| Morales Carrington MD | PP   | [phone] |
+---------------------------+------+-----------------+
 
 
 
 Allergies
 
 
+-----------------+----------------------+----------+----------+----------------------+
| Active Allergy  | Reactions            | Severity | Noted    | Comments             |
|                 |                      |          | Date     |                      |
+-----------------+----------------------+----------+----------+----------------------+
| Bupropion Hcl   | Other (See Comments) | Medium   |          |   Mood Change        |
+-----------------+----------------------+----------+----------+----------------------+
| Fentanyl        | Other (See Comments) | High     | 20 |   Heart Palptation,  |
|                 |                      |          | 13       | fatigue, itching,    |
|                 |                      |          |          | and constipation     |
+-----------------+----------------------+----------+----------+----------------------+
| Tricyclic       | Diarrhea, Nausea And | Medium   | 20 |                      |
| Antidepressants |  Vomiting            |          | 13       |                      |
+-----------------+----------------------+----------+----------+----------------------+
 
 
 
 Current Medications
 
 
+----------------------+----------------------+-------+---------+------+------+-------+
| Prescription         | Sig.                 | Disp. | Refills | Star | End  | Statu |
|                      |                      |       |         | t    | Date | s     |
|                      |                      |       |         | Date |      |       |
+----------------------+----------------------+-------+---------+------+------+-------+
|   ARIPiprazole       | Take 2 mg by mouth   |       |         |  |      | Activ |
| (ABILIFY) 2 mg       | Daily.               |       |         |  |      | e     |
| tablet               |                      |       |         | 12   |      |       |
+----------------------+----------------------+-------+---------+------+------+-------+
|   Calcium            | Take  by mouth       |       |         |  |      | Activ |
| Carbonate-Vit D-Min  | Daily.               |       |         | 20 |      | e     |
| (CALTRATE 600+D      |                      |       |         | 12   |      |       |
| PLUS) 600-400        |                      |       |         |      |      |       |
| MG-UNIT TABS         |                      |       |         |      |      |       |
+----------------------+----------------------+-------+---------+------+------+-------+
|                      | daily                |       |         | 091 |      | Activ |
| multivitamin-iron-mi |                      |       |         | 5/20 |      | e     |
| nerals-folic acid    |                      |       |         | 12   |      |       |
| (CENTRUM) chewable   |                      |       |         |      |      |       |
| tablet               |                      |       |         |      |      |       |
+----------------------+----------------------+-------+---------+------+------+-------+
|   naproxen sodium    | Take 220 mg by mouth |       |         |  |      | Activ |
 
| (ALEVE) 220 MG       |  as needed.          |       |         |  |      | e     |
| tablet               |                      |       |         | 12   |      |       |
+----------------------+----------------------+-------+---------+------+------+-------+
|   atorvaSTATin       | Take 20 mg by mouth  |       |         |  |      | Activ |
| (LIPITOR) 20 mg      | Daily.               |       |         |  |      | e     |
| tablet               |                      |       |         | 12   |      |       |
+----------------------+----------------------+-------+---------+------+------+-------+
|   sertraline         | 2 tablets daily      |       |         |  |      | Activ |
| (ZOLOFT) 100 mg      |                      |       |         |  |      | e     |
| tablet               |                      |       |         | 12   |      |       |
+----------------------+----------------------+-------+---------+------+------+-------+
|   levothyroxine      | Take 88 mcg by mouth |       |         |  |      | Activ |
| (SYNTHROID) 88 mcg   |  Daily.              |       |         |  |      | e     |
| tablet               |                      |       |         | 12   |      |       |
+----------------------+----------------------+-------+---------+------+------+-------+
|   acetaminophen      | Take 325 mg by mouth |       |         |  |      | Activ |
| (TYLENOL) 325 mg     |  as needed.          |       |         |  |      | e     |
| tablet               |                      |       |         | 12   |      |       |
+----------------------+----------------------+-------+---------+------+------+-------+
|   clonazePAM         | Take 1 mg by mouth 3 |       |         |  |      | Activ |
| (KLONOPIN) 1 mg      |  times daily as      |       |         |  |      | e     |
| tablet               | needed.              |       |         | 12   |      |       |
+----------------------+----------------------+-------+---------+------+------+-------+
|                      | 2 tablets once daily |       |         |  |      | Activ |
| candesartan-hydrochl |                      |       |         |  |      | e     |
| orothiazide (ATACAND |                      |       |         | 12   |      |       |
|  HCT) 32-12.5 MG per |                      |       |         |      |      |       |
|  tablet              |                      |       |         |      |      |       |
+----------------------+----------------------+-------+---------+------+------+-------+
 
 
 
 Active Problems
 
 
+----------------------------------------------+------------+
| Problem                                      | Noted Date |
+----------------------------------------------+------------+
| CHRONIC MIGRAINE W/O AURA W/INTRACTABLE W/SM | 2012 |
+----------------------------------------------+------------+
| OCCIPITAL NEURALGIA                          | 2012 |
+----------------------------------------------+------------+
| DEGENERATIVE DISC DISEASE                    |            |
+----------------------------------------------+------------+
| HERNIATED DISC                               |            |
+----------------------------------------------+------------+
| HEADACHE                                     |            |
+----------------------------------------------+------------+
 
 
 
+------------------------------------+
|   Overview:   ICD-10 Record update |
+------------------------------------+
 
 
 
+-------------------------------+---+
| MENINGIOMA, SPINAL C2         |   |
+-------------------------------+---+
 
| SPINAL STENOSIS, CERVICAL     |   |
+-------------------------------+---+
| NECK PAIN                     |   |
+-------------------------------+---+
| SPASMODIC TORTICOLLIS         |   |
+-------------------------------+---+
| CHRONIC TENSION TYPE HEADACHE |   |
+-------------------------------+---+
| Anxiety                       |   |
+-------------------------------+---+
| HTN (hypertension)            |   |
+-------------------------------+---+
| Hypercholesteremia            |   |
+-------------------------------+---+
| Hypothyroidism                |   |
+-------------------------------+---+
| Depression                    |   |
+-------------------------------+---+
| Pneumonia                     |   |
+-------------------------------+---+
| Kidney stones                 |   |
+-------------------------------+---+
 
 
 
 Family History
 
 
+-----------------+-----------+------+----------+
| Medical History | Relation  | Name | Comments |
+-----------------+-----------+------+----------+
| Cancer          | Maternal  |      |          |
|                 | Uncle     |      |          |
+-----------------+-----------+------+----------+
| Cancer          | Maternal  |      |          |
|                 | Uncle     |      |          |
+-----------------+-----------+------+----------+
| Arthritis       | Mother    |      |          |
+-----------------+-----------+------+----------+
 
 
 
+----------------+------+--------+----------+
| Relation       | Name | Status | Comments |
+----------------+------+--------+----------+
| Maternal Uncle |      |        |          |
+----------------+------+--------+----------+
| Maternal Uncle |      |        |          |
+----------------+------+--------+----------+
| Mother         |      |        |          |
+----------------+------+--------+----------+
 
 
 
 Social History
 
 
+---------------+-------+-----------+--------+------+
| Tobacco Use   | Types | Packs/Day | Years  | Date |
|               |       |           | Used   |      |
 
+---------------+-------+-----------+--------+------+
| Former Smoker |       |           |        |      |
+---------------+-------+-----------+--------+------+
 
 
 
+---------------------+---+---+---+
| Smokeless Tobacco:  |   |   |   |
| Never Used          |   |   |   |
+---------------------+---+---+---+
 
 
 
+-------------+-----------+---------+----------+
| Alcohol Use | Drinks/We | oz/Week | Comments |
|             | ek        |         |          |
+-------------+-----------+---------+----------+
| Yes         |           |         | RARELY   |
+-------------+-----------+---------+----------+
 
 
 
+------------------+---------------+
| Sex Assigned at  | Date Recorded |
| Birth            |               |
+------------------+---------------+
| Not on file      |               |
+------------------+---------------+
 
 
 
 Last Filed Vital Signs
 
 
+-------------------+------------------+---------------------+
| Vital Sign        | Reading          | Time Taken          |
+-------------------+------------------+---------------------+
| Blood Pressure    | 105/66           | 2013 PDT |
+-------------------+------------------+---------------------+
| Pulse             | 88               | 2013 PDT |
+-------------------+------------------+---------------------+
| Temperature       | -                | -                   |
+-------------------+------------------+---------------------+
| Respiratory Rate  | 18               | 2013 PDT |
+-------------------+------------------+---------------------+
| Oxygen Saturation | -                | -                   |
+-------------------+------------------+---------------------+
| Inhaled Oxygen    | -                | -                   |
| Concentration     |                  |                     |
+-------------------+------------------+---------------------+
| Weight            | 95.3 kg (210 lb) | 2013 PDT |
+-------------------+------------------+---------------------+
| Height            | 160 cm (5' 3")   | 2013 PDT |
+-------------------+------------------+---------------------+
| Body Mass Index   | 37.2             | 2013 PDT |
+-------------------+------------------+---------------------+
 
 
 
 Plan of Treatment
 
 
 
+---------------------+-----------+-----------+----------+
| Health Maintenance  | Due Date  | Last Done | Comments |
+---------------------+-----------+-----------+----------+
| Vaccine:            | 08/15/197 |           |          |
| Dtap/Tdap/Td (1 -   | 2         |           |          |
| Tdap)               |           |           |          |
+---------------------+-----------+-----------+----------+
| Vaccine: Zoster (1  | 08/15/200 |           |          |
| of 2)               | 3         |           |          |
+---------------------+-----------+-----------+----------+
| Vaccine:            | 08/15/201 |           |          |
| Pneumococcal 65+    | 8         |           |          |
| Low/Medium Risk (1  |           |           |          |
| of 2 - PCV13)       |           |           |          |
+---------------------+-----------+-----------+----------+
| Vaccine: Influenza  |  |           |          |
| (#1)                | 8         |           |          |
+---------------------+-----------+-----------+----------+
 
 
 
 Results
 Not on filefrom Last 3 Months
 
 Insurance
 
 
+---------------+--------+-------------+------+-------------+---------+
| Payer         | Benefi | Subscriber  | Type | Phone       | Address |
|               | t Plan | ID          |      |             |         |
|               |  /     |             |      |             |         |
|               | Group  |             |      |             |         |
+---------------+--------+-------------+------+-------------+---------+
| [employer] | PACIFI | [id number] | PPO  | [phone]         |
|               | [facility] |             |      | [phone]        |         |
|               | E      |             |      |             |         |
|               | FIRST  |             |      |             |         |
|               | CHOICE |             |      |             |         |
+---------------+--------+-------------+------+-------------+---------+
 
 
 
+-----------------+--------+-------------+--------+-------------+---------------------+
| Guarantor Name  | [employer]oun | Relation to | Date   | Phone       | Billing Address     |
|                 | t Type |  Patient    | of     |             |                     |
|                 |        |             | Birth  |             |                     |
+-----------------+--------+-------------+--------+-------------+---------------------+
| YOLETTE SOTO | Person | Self        | [birth year] |   Work:     |   [address]    |
|                 | [address] |             |    | [phone] | [place] [address] |
|                 | dusty    |             |        | [address]  Home: |                     |
|                 |        |             |        |             |                     |
|                 |        |             |        | [phone] |                     |
|                 |        |             |        | 137        |                     |
+-----------------+--------+-------------+--------+-------------+---------------------+

## 2018-10-06 NOTE — XMS
Clinical Summary
  Created on: 10/08/2018
 
 Yolette Soto
 External Reference #: [id number]
 : [birth year]
 Sex: Female
 
 Demographics
 
 
+-----------------------+------------------------+
| Address               | [address]        |
|                       | [place]  [address]   |
+-----------------------+------------------------+
| Home Phone            | [phone]        |
+-----------------------+------------------------+
| Preferred Language    | Unknown                |
+-----------------------+------------------------+
| Marital Status        |                 |
+-----------------------+------------------------+
| Orthodox Affiliation | Unknown                |
+-----------------------+------------------------+
| Race                  | White                  |
+-----------------------+------------------------+
| Ethnic Group          | Not  or  |
+-----------------------+------------------------+
 
 
 Author
 
 
+--------------+-----------------------+
| Author       | [facility] |
+--------------+-----------------------+
| Organization | [facility] |
+--------------+-----------------------+
| Address      | Unknown               |
+--------------+-----------------------+
| Phone        | Unavailable           |
+--------------+-----------------------+
 
 
 
 Support
 
 
+-----------+--------------+---------+-----------------+
| Name      | Relationship | Address | Phone           |
+-----------+--------------+---------+-----------------+
| Nadia Vu | ECON         | Unknown | [phone] |
+-----------+--------------+---------+-----------------+
 
 
 
 Care Team Providers
 
 
 
+-----------------------+------+-------------+
| Care Team Member Name | Role | Phone       |
+-----------------------+------+-------------+
 PP   | Unavailable |
+-----------------------+------+-------------+
 
 
 
 Source Comments
 [facility] is fully live on both [facility] and [facility]
 
 Allergies
 No Known Allergies
 
 Current Medications
 
 
+----------------------+----------------------+-------+---------+------+------+-------+
| Prescription         | Sig.                 | Disp. | Refills | Star | End  | Statu |
|                      |                      |       |         | t    | Date | s     |
|                      |                      |       |         | Date |      |       |
+----------------------+----------------------+-------+---------+------+------+-------+
|                      | Take 1 Tab by mouth  |       |         |      |      | Activ |
| candesartan-hydrochl | once daily.          |       |         |      |      | e     |
| orothiazide (ATACAND |                      |       |         |      |      |       |
|  HCT) 32-12.5 mg     |                      |       |         |      |      |       |
| Oral Tablet          |                      |       |         |      |      |       |
+----------------------+----------------------+-------+---------+------+------+-------+
|   atorvastatin       | Take 20 mg by mouth  |       |         |      |      | Activ |
| (LIPITOR) 20 mg Oral | once daily.          |       |         |      |      | e     |
|  Tablet              |                      |       |         |      |      |       |
+----------------------+----------------------+-------+---------+------+------+-------+
|   Levothyroxine 88   | Take 1 Cap by mouth  |       |         |      |      | Activ |
| mcg Oral Capsule     | once daily.          |       |         |      |      | e     |
+----------------------+----------------------+-------+---------+------+------+-------+
|   sertraline         | Take 200 mg by mouth |       |         |      |      | Activ |
| (ZOLOFT) 100 mg Oral |  once daily.         |       |         |      |      | e     |
|  Tablet              |                      |       |         |      |      |       |
+----------------------+----------------------+-------+---------+------+------+-------+
|   ARIPiprazole       | Take 2 mg by mouth   |       |         |      |      | Activ |
| (ABILIFY) 2 mg Oral  | once daily.          |       |         |      |      | e     |
| Tablet               |                      |       |         |      |      |       |
+----------------------+----------------------+-------+---------+------+------+-------+
 
 
 
 Active Problems
 
 
+-------------------------+------------+
| Problem                 | Noted Date |
+-------------------------+------------+
| Spinal meningioma (HCC) | 2013 |
+-------------------------+------------+
 
 
 
 Family History
 
 
 
+-----------------+----------+------+----------+
| Medical History | Relation | Name | Comments |
+-----------------+----------+------+----------+
| Arthritis       | Mother   |      |          |
+-----------------+----------+------+----------+
| Hypertension    | Mother   |      |          |
+-----------------+----------+------+----------+
 
 
 
+----------+------+--------+----------+
| Relation | Name | Status | Comments |
+----------+------+--------+----------+
| Mother   |      |        |          |
+----------+------+--------+----------+
 
 
 
 Social History
 
 
+--------------+-------+-----------+--------+------+
| Tobacco Use  | Types | Packs/Day | Years  | Date |
|              |       |           | Used   |      |
+--------------+-------+-----------+--------+------+
| Never Smoker |       |           |        |      |
+--------------+-------+-----------+--------+------+
 
 
 
+-------------+-----------+---------+----------+
| Alcohol Use | Drinks/We | oz/Week | Comments |
|             | ek        |         |          |
+-------------+-----------+---------+----------+
| No          |           |         |          |
+-------------+-----------+---------+----------+
 
 
 
+------------------+---------------+
| Sex Assigned at  | Date Recorded |
| Birth            |               |
+------------------+---------------+
| Not on file      |               |
+------------------+---------------+
 
 
 
 Last Filed Vital Signs
 
 
+-------------------+----------------------+-------------------------+
| Vital Sign        | Reading              | Time Taken              |
+-------------------+----------------------+-------------------------+
| Blood Pressure    | 100/58               | 2013 12:43 PM PDT |
+-------------------+----------------------+-------------------------+
| Pulse             | 112                  | 2013 12:43 PM PDT |
+-------------------+----------------------+-------------------------+
| Temperature       | 36.8   C (98.3   F)  | 2013 12:43 PM PDT |
 
+-------------------+----------------------+-------------------------+
| Respiratory Rate  | -                    | -                       |
+-------------------+----------------------+-------------------------+
| Oxygen Saturation | -                    | -                       |
+-------------------+----------------------+-------------------------+
| Inhaled Oxygen    | -                    | -                       |
| Concentration     |                      |                         |
+-------------------+----------------------+-------------------------+
| Weight            | 96.1 kg (211 lb 12.8 | 2013 12:43 PM PDT |
|                   |  oz)                 |                         |
+-------------------+----------------------+-------------------------+
| Height            | 161.3 cm (5' 3.5")   | 2011  1:01 PM PST |
+-------------------+----------------------+-------------------------+
| Body Mass Index   | 36.93                | 2013 12:43 PM PDT |
+-------------------+----------------------+-------------------------+
 
 
 
 Plan of Treatment
 
 
+----------------------+-----------+-----------+----------+
| Health Maintenance   | Due Date  | Last Done | Comments |
+----------------------+-----------+-----------+----------+
| Pneumococcal (Adult) | 08/15/201 |           |          |
|  (1 of 2 - PCV13)    | 8         |           |          |
+----------------------+-----------+-----------+----------+
| Influenza (Flu)      |  |           |          |
| vaccination (#1)     | 8         |           |          |
+----------------------+-----------+-----------+----------+
 
 
 
 Results
 Not on filefrom Last 3 Months
 
 Insurance
 
 
+---------------+--------+-------------+------+-------------+-----------------+
| Payer         | Benefi | Subscriber  | Type | Phone       | Address         |
|               | t Plan | ID          |      |             |                 |
|               |  /     |             |      |             |                 |
|               | Group  |             |      |             |                 |
+---------------+--------+-------------+------+-------------+-----------------+
| [employer] | HEALTH | xxxxxxxxx   | PPO  | [phone] |   [address]   |
|               | NET    |             |      | [address]        | [place]  |
|               | PPO    |             |      |             | [id number]      |
+---------------+--------+-------------+------+-------------+-----------------+
 
 
 
+-------------------+--------+-------------+--------+-------------+---------------------+
| Guarantor Name    | [employer] | Relation to | Date   | Phone       | Billing Address     |
|                   | t Type |  Patient    | of     |             |                     |
|                   |        |             | Birth  |             |                     |
+-------------------+--------+-------------+--------+-------------+---------------------+
| YOLETTE SOTO | Person | Self        | [birth year] |   Home:     |   [address] [place] [address]   |
|                   | al/Fam |             | 1955   | [phone] | [place] [address] |
|                   | dusty    |             |        | [phone]        |                     |
 
+-------------------+--------+-------------+--------+-------------+---------------------+